# Patient Record
(demographics unavailable — no encounter records)

---

## 2018-03-20 NOTE — RAD
RIGHT ELBOW 4 VIEWS:

 

Date:  03/20/18 

 

HISTORY:  

Pain. 

 

COMPARISON:  

None. 

 

FINDINGS:

There is a small elbow joint effusion. The radiocapitellar alignment is normal. The anterior humeral 
line bisects the junction of the anterior 1/3 and middle 1/3 capitellum. 

 

Mild soft tissue edema. 

 

IMPRESSION: 

Small joint effusion with findings suggesting nondisplaced supracondylar fracture. 

 

 

POS: [facility]

## 2018-03-20 NOTE — RAD
THREE VIEWS RIGHT WRIST:

 

Date: 3-20-18 

 

History: Right wrist pain after pulling away from patient's father last night. Patient has not been f
lexing the joint. 

 

FINDINGS: 

No fracture is visualized. No other osseous abnormality noted. 

 

IMPRESSION: 

No acute osseous abnormality right wrist. 

 

POS: [facility]